# Patient Record
(demographics unavailable — no encounter records)

---

## 2025-04-17 NOTE — HISTORY OF PRESENT ILLNESS
[FreeTextEntry1] : 28-year-old -0-1-0 at 7 weeks 2 days by LMP of 2025 presents for confirmation of pregnancy.  She is accompanied today by her mother and .  Complains of intermittent nausea.  Eating in small amounts and drinking in small amounts.  Taking a prenatal vitamin at night.  History of elective TOP (medical)  History of ADHD.  Was on Adderall and stopped when she found out she was pregnant.  Reports overall good health.  This pregnancy was spontaneous and is desired.

## 2025-04-17 NOTE — PROCEDURE
[Transvaginal OB Sonogram] : Transvaginal OB Sonogram [CRL: ___ (mm)] : CRL - [unfilled]Umm [Current GA by Sonogram: ___ (wks)] : Current GA by Sonogram: [unfilled]Uwks [___ day(s)] : [unfilled] days [FreeTextEntry1] : Single intrauterine pregnancy with positive fetal cardiac activity noted.  Yolk sac and fetal pole noted.  Dating is consistent with LMP; estimated due date 12/2/2025.  No free fluid in cul-de-sac.  Both ovaries were visualized and appear normal.

## 2025-04-17 NOTE — PLAN
[FreeTextEntry1] : History reviewed.  First trimester teaching done.  Pregnancy precautions reviewed as well as when to notify provider. All questions answered.  NOB visit and NT screen with MFM between 11-13.5 wks. Discussed appropriate scheduling with patient. Rx for NT screen and medication list provided.  Strategies for nausea discussed. Rx sent. Correct use discussed.